# Patient Record
(demographics unavailable — no encounter records)

---

## 2025-01-02 NOTE — HISTORY OF PRESENT ILLNESS
[de-identified] : Jasmyn Gomez is a 45 year old female here for follow up of anemia.   initial hematology consult 6/19/24.  severe anemia.  Multifactorial - advanced CKD, multiple other chronic illnesses, iron deficiency. Previous PRBC transfusions in 2022, 2023, 2024. IV iron (feraheme) 7/2024. pt did not respond well to feraheme - pre-treatment hgb was 6.9, and peak was 8.1. refuses to take oral iron due to chronic constipation.  ROS + hx of menorrhagia.  Menses last 10 days.  She has heavy bleeding and wears incontinence pullups instead of pads and tampons.  She does not regularly see GYN.  She had a LEEP for CIN3 in 2022 and has not had any gyn f/u since then.  she states "I would rather not know if I have cancer."  She reports chronic constipation.  She has never had EGD or colonoscopy.  Was scheduled in Summer 2024 but she no showed.  PMH, PSH - reviewed FM[facility] - not contributory SH - smoker.  unemployed.  Lives in [place].   [de-identified] : here for f/u.  Reports continued fatigue and cold intolerance but not as severe as in the summer. felt better after IV iron. went to [facility] last week for transplant eval.  hgb was 7.6.  They told her to f/u with hematology. she reports continued regular menses but flow is not as heavy as it was a few months ago. She is not taking any PO iron or vitamins. She is not taking any of her BP meds.  She says she has white coat HTN.  she says she checks BP at home and it is normal.  BP is elevated today but she symptoms of this. She denies blood in stool, urine.  denies dysphagia. wt stable compared to last visit to this office 6-19-24

## 2025-01-02 NOTE — PHYSICAL EXAM
[Normal] : affect appropriate [de-identified] : supple [de-identified] : normal RR, breathing pattern [de-identified] : normal HR [de-identified] : not distended [de-identified] : pallor

## 2025-01-06 NOTE — HISTORY OF PRESENT ILLNESS
[FreeTextEntry1] : Jasmyn Gomez is a 45 year old female with PMH of CAD, T2DM, LADONNA, CKD4, cervical myelopathy, HTN, depression and PAD who presents for initial consultation of stroke.  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

## 2025-02-25 NOTE — HISTORY OF PRESENT ILLNESS
[FreeTextEntry1] : 45yoF w/PMHx of CAD, chronic HTN, [id number], HPV, PAD, s/p CVA, referred by Dr. Jose A Oh for ESRD and evaluation for creation of permanent hemodialysis access.  Pt is currently dialyzed qMWF 1p at [facility] via [facility].  She reports no issues w/the catheter access but pt is in need of permanent access.  She is RHD and denies any injuries/trauma/thrombus to either UE.

## 2025-02-25 NOTE — ADDENDUM
[FreeTextEntry1] : This note was written by Liban Jones, acting as a scribe for Dr. Prince Smith.  I, Dr. Prince Smith, have read and attest that all the information, medical decision-making, and discharge instructions within are true and accurate.  I, Dr. Prince Smith, personally performed the evaluation and management (E/M) services for this new patient.  That E/M includes conducting the initial examination, assessing all conditions, and establishing the plan of care.  Today, my ACP, Liban Jones, was here to observe my evaluation and management services for this patient to be followed going forward.

## 2025-02-25 NOTE — PHYSICAL EXAM
[JVD] : no jugular venous distention  [Normal Thyroid] : the thyroid was normal [Carotid Bruits] : no carotid bruits [Normal Breath Sounds] : Normal breath sounds [Respiratory Effort] : normal respiratory effort [Normal Heart Sounds] : normal heart sounds [Normal Rate and Rhythm] : normal rate and rhythm [Right Carotid Bruit] : no bruit heard over the right carotid [Left Carotid Bruit] : no bruit heard over the left carotid [phone] [2+] : left 2+ [Ankle Swelling (On Exam)] : not present [Varicose Veins Of Lower Extremities] : not present [] : not present [Abdomen Masses] : No abdominal masses [Abdomen Tenderness] : ~T ~M No abdominal tenderness [No HSM] : no hepatosplenomegaly [No Rash or Lesion] : No rash or lesion [Purpura] : no purpura  [Petechiae] : no petechiae [Skin Ulcer] : no ulcer [Skin Induration] : no induration [Alert] : alert [Calm] : calm [de-identified] : Healthy, NAD [de-identified] : NC/AT, anicteric [de-identified] : RIJV permacath in place, site clean/dry [de-identified] : FROM throughout, strength 5/5x4, no palpable cords in UEs [de-identified] : Neurosensory/neuromotor grossly intact

## 2025-02-25 NOTE — ASSESSMENT
[FreeTextEntry1] : 45yoF w/PMHx of CAD, chronic HTN, [id number], HPV, PAD, s/p CVA, referred by Dr. Jose A Oh for ESRD and evaluation for creation of permanent hemodialysis access.  Pt is currently dialyzed qMWF 1p at [facility] via [facility].  She reports no issues w/the catheter access but pt is in need of permanent access.  She is RHD and denies any injuries/trauma/thrombus to either UE.
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
## 2025-04-20 NOTE — ASSESSMENT
[FreeTextEntry1] : ======================================================================================= 1. DM2: poorly controlled in the past: A1c 6.0% (08/19/24):   - discussed with patient therapeutic lifestyle changes to improve glucose metabolism  - continue insulin   - continue Ozempic 0.5mg sc q week   - follow up with PMD and endocrinologist, Stone Mistry MD,   2. CVA: s/p fist CVA at age 35 (details unknown), s/p second CVA (thrombosis of right middle cerebral artery) with right-sided weakness and left facial droop) 03/06/23:  - follow up with a new neurologist, Joy Fernandez MD  - continue single antiplatelet therapy with aspirin 81mg po qd (despite anemia) given 2 prior CVAs  3. CAD, mild: s/p 11/05/20: CTA chest: CA+ score 14 (LM 0, LAD 14, LCX 0, RCA 0), LAD mid minimal otherwise normal:   - will pursue conservative management at this time    4. Lower extremity edema: s/p negative work up for possible cardiac etiology: secondary to nephrotic syndrome:   - continue furosemide 40mg po qd prn edema (will use sparingly given occasional orthostatic hypotension)   5. CKD V: diabetic nephropathy with nephrotic range proteinuria: Cr. 3.83, eGFR 14 (01/10/25):   - follow up with nephrologist, Brandi Cook MD  - continue tight glycemic control  - on kidney transplant list at [facility]  6. Anemia: secondary to menorrhagia and CKD IV:  Hgb 6.8 (01/10/25):   - follow up with hematologist, Saray Christine MD  7. 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 Corea   - will send for a lower extremity arterial sonogram to r/o PAD  8. HTN: BP not at ACC/AHA 2017 guideline target: markedly elevated in the office, ? component of "white coat" HTN:  - change valsartan 40mg po qd to vaslartan//25mg po qd (possible adverse effects of new medications discussed)  - will maintain a home blood pressure log for my review  - she will bring in her ambulatory BP device for correlation with office device  - if BP remains above target next visit will up titrate anti-HTN regimen
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
## 2025-05-08 NOTE — PHYSICAL EXAM
[JVD] : no jugular venous distention  [Normal Thyroid] : the thyroid was normal [Normal Breath Sounds] : Normal breath sounds [Respiratory Effort] : normal respiratory effort [Normal Heart Sounds] : normal heart sounds [Normal Rate and Rhythm] : normal rate and rhythm [2+] : left 2+ [No Rash or Lesion] : No rash or lesion [Purpura] : no purpura  [Petechiae] : no petechiae [Skin Ulcer] : no ulcer [Skin Induration] : no induration [Alert] : alert [Calm] : calm [de-identified] : Healthy, NAD [de-identified] : NC/AT, anicteric [de-identified] : FROM throughout, strength 5/5x4, no palpable cords in UEs b/l
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
## 2025-06-09 NOTE — PHYSICAL EXAM
[FreeTextEntry1] : maculopapular rash on all extremities [S4] : no S4 [S3] : no S3 [Right Carotid Bruit] : no bruit heard over the right carotid [Left Carotid Bruit] : no bruit heard over the left carotid [Right Femoral Bruit] : no bruit heard over the right femoral artery [Left Femoral Bruit] : no bruit heard over the left femoral artery

## 2025-06-09 NOTE — ASSESSMENT
[FreeTextEntry1] : ======================================================================================= 1. DM2: poorly controlled in the past: A1c 6.0% (08/19/24):   - discussed with patient therapeutic lifestyle changes to improve glucose metabolism  - continue insulin   - continue Ozempic 0.5mg sc q week   - follow up with PMD and endocrinologist, Stone Mistry MD,   2. CVA: s/p fist CVA at age 35 (details unknown), s/p second CVA (thrombosis of right middle cerebral artery) with right-sided weakness and left facial droop) 03/06/23:  - follow up with a new neurologist, Joy Fernandez MD  - continue single antiplatelet therapy with aspirin 81mg po qd (despite anemia) given 2 prior CVAs  3. CAD, mild: s/p 11/05/20: CTA chest: CA+ score 14 (LM 0, LAD 14, LCX 0, RCA 0), LAD mid minimal otherwise normal:   - will pursue conservative management at this time    4. Lower extremity edema: s/p negative work up for possible cardiac etiology: secondary to nephrotic syndrome:   - continue furosemide 40mg po qd prn edema (will use sparingly given occasional orthostatic hypotension)   5. CKD V: diabetic nephropathy with nephrotic range proteinuria: Cr. 3.83, eGFR 14 (01/10/25):   - follow up with nephrologist, Brandi Cook MD  - continue tight glycemic control  - on kidney transplant list at [facility]  6. Anemia: secondary to menorrhagia and CKD IV:  Hgb 6.8 (01/10/25):   - follow up with hematologist, Saray Christine MD  7. 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 Corea   - will send for a lower extremity arterial sonogram to r/o PAD  8. HTN: BP not at ACC/AHA 2017 guideline target: markedly elevated in the office, ? component of "white coat" HTN:  - change valsartan 40mg po qd to vaslartan//25mg po qd (possible adverse effects of new medications discussed)  - will maintain a home blood pressure log for my review  - she will bring in her ambulatory BP device for correlation with office device  - if BP remains above target next visit will up titrate anti-HTN regimen